# Patient Record
Sex: MALE | Race: WHITE
[De-identification: names, ages, dates, MRNs, and addresses within clinical notes are randomized per-mention and may not be internally consistent; named-entity substitution may affect disease eponyms.]

---

## 2022-07-14 ENCOUNTER — HOSPITAL ENCOUNTER (EMERGENCY)
Dept: HOSPITAL 47 - EC | Age: 42
LOS: 1 days | Discharge: OTHER INSTITUTION | End: 2022-07-15
Payer: COMMERCIAL

## 2022-07-14 VITALS — TEMPERATURE: 98.6 F

## 2022-07-14 DIAGNOSIS — K22.2: Primary | ICD-10-CM

## 2022-07-14 PROCEDURE — 71046 X-RAY EXAM CHEST 2 VIEWS: CPT

## 2022-07-14 PROCEDURE — 85025 COMPLETE CBC W/AUTO DIFF WBC: CPT

## 2022-07-14 PROCEDURE — 96374 THER/PROPH/DIAG INJ IV PUSH: CPT

## 2022-07-14 PROCEDURE — 80053 COMPREHEN METABOLIC PANEL: CPT

## 2022-07-14 PROCEDURE — 96375 TX/PRO/DX INJ NEW DRUG ADDON: CPT

## 2022-07-14 PROCEDURE — 36415 COLL VENOUS BLD VENIPUNCTURE: CPT

## 2022-07-14 PROCEDURE — 43247 EGD REMOVE FOREIGN BODY: CPT

## 2022-07-14 PROCEDURE — 70360 X-RAY EXAM OF NECK: CPT

## 2022-07-14 PROCEDURE — 99285 EMERGENCY DEPT VISIT HI MDM: CPT

## 2022-07-14 PROCEDURE — 96361 HYDRATE IV INFUSION ADD-ON: CPT

## 2022-07-15 VITALS — HEART RATE: 84 BPM | SYSTOLIC BLOOD PRESSURE: 113 MMHG | DIASTOLIC BLOOD PRESSURE: 79 MMHG

## 2022-07-15 VITALS — RESPIRATION RATE: 16 BRPM

## 2022-07-15 LAB
ALBUMIN SERPL-MCNC: 4.6 G/DL (ref 3.5–5)
ALP SERPL-CCNC: 107 U/L (ref 38–126)
ALT SERPL-CCNC: 30 U/L (ref 4–49)
ANION GAP SERPL CALC-SCNC: 13 MMOL/L
AST SERPL-CCNC: 32 U/L (ref 17–59)
BASOPHILS # BLD AUTO: 0 K/UL (ref 0–0.2)
BASOPHILS NFR BLD AUTO: 1 %
BUN SERPL-SCNC: 15 MG/DL (ref 9–20)
CALCIUM SPEC-MCNC: 10 MG/DL (ref 8.4–10.2)
CHLORIDE SERPL-SCNC: 101 MMOL/L (ref 98–107)
CO2 SERPL-SCNC: 24 MMOL/L (ref 22–30)
EOSINOPHIL # BLD AUTO: 0.1 K/UL (ref 0–0.7)
EOSINOPHIL NFR BLD AUTO: 1 %
ERYTHROCYTE [DISTWIDTH] IN BLOOD BY AUTOMATED COUNT: 4.83 M/UL (ref 4.3–5.9)
ERYTHROCYTE [DISTWIDTH] IN BLOOD: 13.7 % (ref 11.5–15.5)
GLUCOSE SERPL-MCNC: 96 MG/DL (ref 74–99)
HCT VFR BLD AUTO: 36.3 % (ref 39–53)
HGB BLD-MCNC: 11.4 GM/DL (ref 13–17.5)
LYMPHOCYTES # SPEC AUTO: 1.9 K/UL (ref 1–4.8)
LYMPHOCYTES NFR SPEC AUTO: 21 %
MCH RBC QN AUTO: 23.5 PG (ref 25–35)
MCHC RBC AUTO-ENTMCNC: 31.3 G/DL (ref 31–37)
MCV RBC AUTO: 75.2 FL (ref 80–100)
MONOCYTES # BLD AUTO: 0.5 K/UL (ref 0–1)
MONOCYTES NFR BLD AUTO: 6 %
NEUTROPHILS # BLD AUTO: 6.3 K/UL (ref 1.3–7.7)
NEUTROPHILS NFR BLD AUTO: 69 %
PLATELET # BLD AUTO: 398 K/UL (ref 150–450)
POTASSIUM SERPL-SCNC: 4.3 MMOL/L (ref 3.5–5.1)
PROT SERPL-MCNC: 8.2 G/DL (ref 6.3–8.2)
SODIUM SERPL-SCNC: 138 MMOL/L (ref 137–145)
WBC # BLD AUTO: 9.1 K/UL (ref 3.8–10.6)

## 2022-07-15 NOTE — XR
EXAMINATION TYPE: XR chest 2V

 

DATE OF EXAM: 7/15/2022

 

COMPARISON: NONE

 

HISTORY: Chest pain

 

TECHNIQUE: 2 views

 

FINDINGS: Heart and mediastinum are normal. Lungs are clear. Diaphragm is normal. Bony thorax appears
 normal.

 

IMPRESSION: Normal chest

## 2022-07-15 NOTE — P.PCN
Date of Procedure: 07/15/22


Procedure(s) Performed: 


BRIEF HISTORY: Patient is a 41-year-old, pleasant, white male in the emergency 

room with acute food impaction.  He was eating chicken nuggets 2 nights ago and 

cannot swallow any further.  He came into the emergency room last night 

scheduled for an upper endoscopy for foreign body removal.





PROCEDURE PERFORMED: Esophagogastroduodenoscopy.





PREOPERATIVE DIAGNOSIS: Acute food impaction. 





IV sedation per anesthesia. 





PROCEDURE: After informed consent was obtained, the patient  was brought into 

the endoscopy unit. IV sedation was administered by Anesthesia under continuous 

monitoring. Initially the Olympus GIF-140 video endoscope was inserted into the 

mouth. Esophagus intubated without any difficulty. It was gradually advanced 

into the esophagus.  A large piece of meat in the distal esophagus that was 

impacted.  Using a snare the meat was cut into pieces and was able to gently 

push it into the stomach.  The scope at this time was advanced into the stomach 

and duodenum and carefully examined. The bulb and the second part of the 

duodenum appeared normal. The scope at this time was withdrawn to the stomach, 

adequately insufflated with air, and upon careful examination, mucosa of the 

antrum, body, cardia and the fundus appeared normal. The scope was then 

withdrawn into the esophagus. The GE junction was located at 39 cm from the 

incisors.  Small hiatal hernia noted.  There was a distal esophageal stricture 

with circumferential erythema and some oozing at the site of food impaction.  

Dilation was not performed.  The rest of the esophagus appeared normal. There 

were no erosions or ulcerations seen and the patient tolerated the procedure 

well. 





IMPRESSION: 


1.  Distal esophageal stricture with large piece of the impacted status post 

removal as described above.


2.  Small hiatal hernia.





RECOMMENDATIONS: The findings of this examination were discussed with the josef bearnt as well as his family.  He was advised to continue with Protonix 40 mg 

daily and follow antireflux measures.  He was advised to follow up with his a 

progress for EGD with dilation of the esophageal stricture in the near future..

## 2022-07-15 NOTE — XR
EXAMINATION TYPE: XR soft tissue neck

 

DATE OF EXAM: 7/15/2022

 

COMPARISON: NONE

 

HISTORY: Choked on a chicken nugget

TECHNIQUE: 2 views

 

FINDINGS: Cervical vertebra have normal alignment. Epiglottis is normal. Subglottic trachea appears n
ormal. Prevertebral soft tissues appear normal. Tonsils and adenoids appear normal. No evidence of a 
foreign body.

 

IMPRESSION: Normal cervical soft tissue exam.

## 2022-07-15 NOTE — ED
ENT HPI





- General


Chief complaint: ENT


Stated complaint: difficulty swallowing


Time Seen by Provider: 07/14/22 23:46


Source: patient, family, RN notes reviewed


Mode of arrival: ambulatory


Limitations: no limitations





- History of Present Illness


Initial comments: 





Says a pleasant 41-year-old male presents emergency back complaining of an 

esophageal foreign body.  He had chicken nugget at about midnight on July 14.  

He states about 24 hours ago.  Patient states that since then he has had a hard 

time swallowing.  Patient states he is able to hold very tiny sips of fluids 

down but anytime she takes a large amount of fluid he vomits.  He has been 

unable to hold any solids down.  Patient has a foreign body sensation in his 

throat.





No headache, no fever or chills, no changes in vision or hearing, no sore throat

or difficulty with speech, no neck pain, no chest pain or shortness of breath, 

no abdominal pain, no nauseno changes in urination or bowel movements, no 

numbness or tingling, no extremity pain, no skin rashes or lesions.





Presently, the patient states she's had intermittent problems swallowing and 

having food boluses "getting stuck "for about 1.5 years..





- Related Data


                                Home Medications











 Medication  Instructions  Recorded  Confirmed


 


Pantoprazole Sodium [Protonix] 40 mg PO BID 07/15/22 07/15/22











                                    Allergies











Allergy/AdvReac Type Severity Reaction Status Date / Time


 


No Known Allergies Allergy   Verified 07/15/22 08:16














Review of Systems


ROS Statement: 


Those systems with pertinent positive or pertinent negative responses have been 

documented in the HPI.





ROS Other: All systems not noted in ROS Statement are negative.





Past Medical History


Additional Past Medical History / Comment(s): hernia


History of Any Multi-Drug Resistant Organisms: None Reported


Past Surgical History: No Surgical Hx Reported


Past Psychological History: No Psychological Hx Reported


Smoking Status: Former smoker


Past Alcohol Use History: Occasional


Past Drug Use History: None Reported





General Exam





- General Exam Comments


Initial Comments: 





Condition does not appear to be ill or toxic.  Vital signs reviewed.  Cranial 

nerves II through XII grossly intact


Limitations: no limitations


General appearance: alert, in no apparent distress


Head exam: Present: atraumatic, normocephalic, normal inspection


Eye exam: Present: normal appearance, PERRL, EOMI.  Absent: scleral icterus, 

conjunctival injection, periorbital swelling


ENT exam: Present: normal exam, normal oropharynx, mucous membranes dry, mucous 

membranes moist, normal external ear exam


Neck exam: Present: normal inspection, full ROM.  Absent: tenderness, 

meningismus, lymphadenopathy


Respiratory exam: Present: normal lung sounds bilaterally.  Absent: respiratory 

distress, wheezes, rales, rhonchi, stridor


Cardiovascular Exam: Present: regular rate, normal rhythm, normal heart sounds. 

 Absent: systolic murmur, diastolic murmur, rubs, gallop, clicks


GI/Abdominal exam: Present: soft, normal bowel sounds.  Absent: distended, 

tenderness, guarding, rebound, rigid


Extremities exam: Present: normal inspection, full ROM, normal capillary refill.

  Absent: tenderness, pedal edema, joint swelling, calf tenderness


Back exam: Present: normal inspection


Neurological exam: Present: alert, oriented X3, CN II-XII intact


Psychiatric exam: Present: normal affect, normal mood


Skin exam: Present: warm, dry, intact, normal color.  Absent: rash





Course


                                   Vital Signs











  07/14/22 07/15/22





  23:41 08:08


 


Temperature 98.6 F 


 


Pulse Rate 71 96


 


Respiratory 22 16





Rate  


 


Blood Pressure 118/73 115/71


 


O2 Sat by Pulse 100 100





Oximetry  














- Reevaluation(s)


Reevaluation #1: 





07/15/22 01:52


Medical record is reviewed


Patientis after administration of glucagon, diazepam, nitroglycerin.  Patient 

unable to swallow liquids or solids.


Patient is informed of results and questions answered


Patient in no distress








- Consultations


Consultation #1: 





Consultation call placed for gastroenterology





Medical Decision Making





- Medical Decision Making





Patient percents of symptomology most consistent with esophageal foreign body.  

We'll try the glucagon, diazepam, nitroglycerin cocktail.  Soft tissue x-rays, 

basic laboratory work, plan for reevaluation





Note the patient does have a history of a hiatal hernia.  However this does not 

appear to be consistent with hiatal hernia symptomology.





Presentation not consistent with cardiopulmonary disease.





Patient endorsed to Dr. Trujillo at 3 AM





Awaiting callback from gastroenterology





- Lab Data


Result diagrams: 


                                 07/15/22 00:00





                                 07/15/22 00:00


                                   Lab Results











  07/15/22 07/15/22 Range/Units





  00:00 00:00 


 


WBC  9.1   (3.8-10.6)  k/uL


 


RBC  4.83   (4.30-5.90)  m/uL


 


Hgb  11.4 L   (13.0-17.5)  gm/dL


 


Hct  36.3 L   (39.0-53.0)  %


 


MCV  75.2 L   (80.0-100.0)  fL


 


MCH  23.5 L   (25.0-35.0)  pg


 


MCHC  31.3   (31.0-37.0)  g/dL


 


RDW  13.7   (11.5-15.5)  %


 


Plt Count  398   (150-450)  k/uL


 


MPV  8.2   


 


Neutrophils %  69   %


 


Lymphocytes %  21   %


 


Monocytes %  6   %


 


Eosinophils %  1   %


 


Basophils %  1   %


 


Neutrophils #  6.3   (1.3-7.7)  k/uL


 


Lymphocytes #  1.9   (1.0-4.8)  k/uL


 


Monocytes #  0.5   (0-1.0)  k/uL


 


Eosinophils #  0.1   (0-0.7)  k/uL


 


Basophils #  0.0   (0-0.2)  k/uL


 


Hypochromasia  Moderate   


 


Microcytosis  Slight   


 


Sodium   138  (137-145)  mmol/L


 


Potassium   4.3  (3.5-5.1)  mmol/L


 


Chloride   101  ()  mmol/L


 


Carbon Dioxide   24  (22-30)  mmol/L


 


Anion Gap   13  mmol/L


 


BUN   15  (9-20)  mg/dL


 


Creatinine   0.92  (0.66-1.25)  mg/dL


 


Est GFR (CKD-EPI)AfAm   >90  (>60 ml/min/1.73 sqM)  


 


Est GFR (CKD-EPI)NonAf   >90  (>60 ml/min/1.73 sqM)  


 


Glucose   96  (74-99)  mg/dL


 


Calcium   10.0  (8.4-10.2)  mg/dL


 


Total Bilirubin   0.4  (0.2-1.3)  mg/dL


 


AST   32  (17-59)  U/L


 


ALT   30  (4-49)  U/L


 


Alkaline Phosphatase   107  ()  U/L


 


Total Protein   8.2  (6.3-8.2)  g/dL


 


Albumin   4.6  (3.5-5.0)  g/dL














Disposition


Clinical Impression: 


 Esophageal obstruction due to food impaction





Disposition: ADMITTED AS IP TO THIS [facility]


Condition: Fair


Is patient prescribed a controlled substance at d/c from ED?: No


Time of Disposition: 01:53


Decision to Admit Reason: Admit from EC


Decision Time: 01:53

## 2022-07-25 ENCOUNTER — HOSPITAL ENCOUNTER (EMERGENCY)
Dept: HOSPITAL 47 - EC | Age: 42
Discharge: HOME | End: 2022-07-25
Payer: COMMERCIAL

## 2022-07-25 VITALS
RESPIRATION RATE: 18 BRPM | HEART RATE: 77 BPM | SYSTOLIC BLOOD PRESSURE: 119 MMHG | DIASTOLIC BLOOD PRESSURE: 77 MMHG | TEMPERATURE: 97 F

## 2022-07-25 DIAGNOSIS — R59.0: Primary | ICD-10-CM

## 2022-07-25 DIAGNOSIS — Z87.891: ICD-10-CM

## 2022-07-25 LAB
ALBUMIN SERPL-MCNC: 4.3 G/DL (ref 3.5–5)
ALP SERPL-CCNC: 108 U/L (ref 38–126)
ALT SERPL-CCNC: 22 U/L (ref 4–49)
AMYLASE SERPL-CCNC: 63 U/L (ref 30–110)
ANION GAP SERPL CALC-SCNC: 10 MMOL/L
AST SERPL-CCNC: 30 U/L (ref 17–59)
BASOPHILS # BLD AUTO: 0 K/UL (ref 0–0.2)
BASOPHILS NFR BLD AUTO: 0 %
BUN SERPL-SCNC: 8 MG/DL (ref 9–20)
CALCIUM SPEC-MCNC: 8.9 MG/DL (ref 8.4–10.2)
CHLORIDE SERPL-SCNC: 105 MMOL/L (ref 98–107)
CO2 SERPL-SCNC: 23 MMOL/L (ref 22–30)
EOSINOPHIL # BLD AUTO: 0 K/UL (ref 0–0.7)
EOSINOPHIL NFR BLD AUTO: 0 %
ERYTHROCYTE [DISTWIDTH] IN BLOOD BY AUTOMATED COUNT: 4.57 M/UL (ref 4.3–5.9)
ERYTHROCYTE [DISTWIDTH] IN BLOOD: 13.8 % (ref 11.5–15.5)
GLUCOSE SERPL-MCNC: 102 MG/DL (ref 74–99)
HCT VFR BLD AUTO: 34.2 % (ref 39–53)
HGB BLD-MCNC: 10.6 GM/DL (ref 13–17.5)
LIPASE SERPL-CCNC: 81 U/L (ref 23–300)
LYMPHOCYTES # SPEC AUTO: 1.1 K/UL (ref 1–4.8)
LYMPHOCYTES NFR SPEC AUTO: 15 %
MCH RBC QN AUTO: 23.3 PG (ref 25–35)
MCHC RBC AUTO-ENTMCNC: 31.1 G/DL (ref 31–37)
MCV RBC AUTO: 74.8 FL (ref 80–100)
MONOCYTES # BLD AUTO: 0.4 K/UL (ref 0–1)
MONOCYTES NFR BLD AUTO: 5 %
NEUTROPHILS # BLD AUTO: 5.7 K/UL (ref 1.3–7.7)
NEUTROPHILS NFR BLD AUTO: 77 %
PH UR: 6.5 [PH] (ref 5–8)
PLATELET # BLD AUTO: 336 K/UL (ref 150–450)
POTASSIUM SERPL-SCNC: 4.1 MMOL/L (ref 3.5–5.1)
PROT SERPL-MCNC: 7.7 G/DL (ref 6.3–8.2)
SODIUM SERPL-SCNC: 138 MMOL/L (ref 137–145)
SP GR UR: 1.02 (ref 1–1.03)
UROBILINOGEN UR QL STRIP: <2 MG/DL (ref ?–2)
WBC # BLD AUTO: 7.4 K/UL (ref 3.8–10.6)

## 2022-07-25 PROCEDURE — 83690 ASSAY OF LIPASE: CPT

## 2022-07-25 PROCEDURE — 83605 ASSAY OF LACTIC ACID: CPT

## 2022-07-25 PROCEDURE — 82150 ASSAY OF AMYLASE: CPT

## 2022-07-25 PROCEDURE — 96360 HYDRATION IV INFUSION INIT: CPT

## 2022-07-25 PROCEDURE — 85025 COMPLETE CBC W/AUTO DIFF WBC: CPT

## 2022-07-25 PROCEDURE — 81003 URINALYSIS AUTO W/O SCOPE: CPT

## 2022-07-25 PROCEDURE — 36415 COLL VENOUS BLD VENIPUNCTURE: CPT

## 2022-07-25 PROCEDURE — 99284 EMERGENCY DEPT VISIT MOD MDM: CPT

## 2022-07-25 PROCEDURE — 80053 COMPREHEN METABOLIC PANEL: CPT

## 2022-07-25 PROCEDURE — 74177 CT ABD & PELVIS W/CONTRAST: CPT

## 2022-07-25 NOTE — ED
General Adult HPI





- General


Chief complaint: Abdominal Pain


Stated complaint: Fever,RT sided ABD pain


Time Seen by Provider: 07/25/22 08:35


Source: patient, RN notes reviewed, old records reviewed


Mode of arrival: ambulatory


Limitations: no limitations





- History of Present Illness


Initial comments: 





41-year-old well-appearing male presents to the emergency room with intermittent

right groin pain.  Patient states that he did notice a tender lump which r

esolves when he lays down.  Patient states when he first noticed some swelling 

he went to his primary care, Dr. Bush Wednesday who did an ultrasound in the 

office and states that he had some swollen lymph nodes and a possible inguinal 

hernia.  He states that after Dr. Bush evaluated him and pushed on his groing

he's had pain since.  He was prescribed antibiotics for lymphadenopathy and 

started them on Friday. He was referred to surgery but does not have an 

appointment until the middle of August.  Denies any nausea vomiting diarrhea.  


-: month(s)


Location: abdomen (Right groin)


Severity scale (1-10): 0


Consistency: intermittent, now resolved


Associated Symptoms: fever/chills





- Related Data


                                Home Medications











 Medication  Instructions  Recorded  Confirmed


 


Pantoprazole Sodium [Protonix] 40 mg PO BID 07/15/22 07/25/22


 


cephALEXin [cephALEXin Oral Susp] 500 mg PO TID 07/25/22 07/25/22











                                    Allergies











Allergy/AdvReac Type Severity Reaction Status Date / Time


 


No Known Allergies Allergy   Verified 07/25/22 10:04














Review of Systems


ROS Statement: 


Those systems with pertinent positive or pertinent negative responses have been 

documented in the HPI.





ROS Other: All systems not noted in ROS Statement are negative.





Past Medical History


Additional Past Medical History / Comment(s): hernia


History of Any Multi-Drug Resistant Organisms: None Reported


Past Surgical History: No Surgical Hx Reported


Past Psychological History: No Psychological Hx Reported


Smoking Status: Former smoker


Past Alcohol Use History: Occasional


Past Drug Use History: None Reported





General Exam


Limitations: no limitations


General appearance: alert, in no apparent distress


Head exam: Present: atraumatic


Eye exam: Present: normal appearance.  Absent: scleral icterus, conjunctival 

injection


Respiratory exam: Present: normal lung sounds bilaterally.  Absent: respiratory 

distress, accessory muscle use


Cardiovascular Exam: Present: tachycardia


GI/Abdominal exam: Present: soft, other (Right inguinal pain).  Absent: 

distended, tenderness


Extremities exam: Present: normal capillary refill.  Absent: pedal edema


Back exam: Present: normal inspection, full ROM.  Absent: tenderness, CVA 

tenderness (R), CVA tenderness (L), rash noted


Neurological exam: Present: alert, oriented X3, normal gait


Psychiatric exam: Present: normal affect, normal mood


Skin exam: Present: warm, dry, normal color.  Absent: cyanosis, diaphoretic





Course


                                   Vital Signs











  07/25/22 07/25/22





  08:03 10:00


 


Temperature 98.5 F 97 F L


 


Pulse Rate 108 H 77


 


Respiratory 16 18





Rate  


 


Blood Pressure 141/99 119/77


 


O2 Sat by Pulse 99 97





Oximetry  














Medical Decision Making





- Medical Decision Making


Patient presents with right groin pain, was seen by his primary care doctor and 

treated for lymphadenopathy and is currently taking antibiotics.  He denies any 

penile discharge or testicular pain.  He states that he did have a fever which 

has resolved.  Vital signs are stable.  There is no evidence of incarcerated 

hernia.


CT shows mildly large right inguinal lymph nodes with some associated mild fat 

stranding. 


No evidence of leukocytosis.  Hemoglobin and hematocrit are stable.  Lactic acid

is 1.1.  Electrolytes are unremarkable.  Urinalysis is negative.  


He'll  was directed to continue the antibiotics and follow up with his primary 

care doctor, keep the appointment with surgery on August 15.


He was directed to return to the emergency room with a new or concerning sympt

oms including worsening pain, persistent nausea vomiting or fevers.   Patient is

agreeable to this plan of care. 


Case discussed with Dr. Leigh. 








- Lab Data


Result diagrams: 


                                 07/25/22 08:15





                                 07/25/22 08:15


                                   Lab Results











  07/25/22 07/25/22 07/25/22 Range/Units





  08:15 08:15 08:15 


 


WBC  7.4    (3.8-10.6)  k/uL


 


RBC  4.57    (4.30-5.90)  m/uL


 


Hgb  10.6 L    (13.0-17.5)  gm/dL


 


Hct  34.2 L    (39.0-53.0)  %


 


MCV  74.8 L    (80.0-100.0)  fL


 


MCH  23.3 L    (25.0-35.0)  pg


 


MCHC  31.1    (31.0-37.0)  g/dL


 


RDW  13.8    (11.5-15.5)  %


 


Plt Count  336    (150-450)  k/uL


 


MPV  7.9    


 


Neutrophils %  77    %


 


Lymphocytes %  15    %


 


Monocytes %  5    %


 


Eosinophils %  0    %


 


Basophils %  0    %


 


Neutrophils #  5.7    (1.3-7.7)  k/uL


 


Lymphocytes #  1.1    (1.0-4.8)  k/uL


 


Monocytes #  0.4    (0-1.0)  k/uL


 


Eosinophils #  0.0    (0-0.7)  k/uL


 


Basophils #  0.0    (0-0.2)  k/uL


 


Hypochromasia  Marked    


 


Microcytosis  Slight    


 


Sodium   138   (137-145)  mmol/L


 


Potassium   4.1   (3.5-5.1)  mmol/L


 


Chloride   105   ()  mmol/L


 


Carbon Dioxide   23   (22-30)  mmol/L


 


Anion Gap   10   mmol/L


 


BUN   8 L   (9-20)  mg/dL


 


Creatinine   0.68   (0.66-1.25)  mg/dL


 


Est GFR (CKD-EPI)AfAm   >90   (>60 ml/min/1.73 sqM)  


 


Est GFR (CKD-EPI)NonAf   >90   (>60 ml/min/1.73 sqM)  


 


Glucose   102 H   (74-99)  mg/dL


 


Plasma Lactic Acid Devonte     (0.7-2.0)  mmol/L


 


Calcium   8.9   (8.4-10.2)  mg/dL


 


Total Bilirubin   0.5   (0.2-1.3)  mg/dL


 


AST   30   (17-59)  U/L


 


ALT   22   (4-49)  U/L


 


Alkaline Phosphatase   108   ()  U/L


 


Total Protein   7.7   (6.3-8.2)  g/dL


 


Albumin   4.3   (3.5-5.0)  g/dL


 


Amylase    63  ()  U/L


 


Lipase    81  ()  U/L


 


Urine Color     


 


Urine Appearance     (Clear)  


 


Urine pH     (5.0-8.0)  


 


Ur Specific Gravity     (1.001-1.035)  


 


Urine Protein     (Negative)  


 


Urine Glucose (UA)     (Negative)  


 


Urine Ketones     (Negative)  


 


Urine Blood     (Negative)  


 


Urine Nitrite     (Negative)  


 


Urine Bilirubin     (Negative)  


 


Urine Urobilinogen     (<2.0)  mg/dL


 


Ur Leukocyte Esterase     (Negative)  














  07/25/22 07/25/22 Range/Units





  09:08 09:14 


 


WBC    (3.8-10.6)  k/uL


 


RBC    (4.30-5.90)  m/uL


 


Hgb    (13.0-17.5)  gm/dL


 


Hct    (39.0-53.0)  %


 


MCV    (80.0-100.0)  fL


 


MCH    (25.0-35.0)  pg


 


MCHC    (31.0-37.0)  g/dL


 


RDW    (11.5-15.5)  %


 


Plt Count    (150-450)  k/uL


 


MPV    


 


Neutrophils %    %


 


Lymphocytes %    %


 


Monocytes %    %


 


Eosinophils %    %


 


Basophils %    %


 


Neutrophils #    (1.3-7.7)  k/uL


 


Lymphocytes #    (1.0-4.8)  k/uL


 


Monocytes #    (0-1.0)  k/uL


 


Eosinophils #    (0-0.7)  k/uL


 


Basophils #    (0-0.2)  k/uL


 


Hypochromasia    


 


Microcytosis    


 


Sodium    (137-145)  mmol/L


 


Potassium    (3.5-5.1)  mmol/L


 


Chloride    ()  mmol/L


 


Carbon Dioxide    (22-30)  mmol/L


 


Anion Gap    mmol/L


 


BUN    (9-20)  mg/dL


 


Creatinine    (0.66-1.25)  mg/dL


 


Est GFR (CKD-EPI)AfAm    (>60 ml/min/1.73 sqM)  


 


Est GFR (CKD-EPI)NonAf    (>60 ml/min/1.73 sqM)  


 


Glucose    (74-99)  mg/dL


 


Plasma Lactic Acid Devonte  1.1   (0.7-2.0)  mmol/L


 


Calcium    (8.4-10.2)  mg/dL


 


Total Bilirubin    (0.2-1.3)  mg/dL


 


AST    (17-59)  U/L


 


ALT    (4-49)  U/L


 


Alkaline Phosphatase    ()  U/L


 


Total Protein    (6.3-8.2)  g/dL


 


Albumin    (3.5-5.0)  g/dL


 


Amylase    ()  U/L


 


Lipase    ()  U/L


 


Urine Color   Yellow  


 


Urine Appearance   Clear  (Clear)  


 


Urine pH   6.5  (5.0-8.0)  


 


Ur Specific Gravity   1.025  (1.001-1.035)  


 


Urine Protein   Negative  (Negative)  


 


Urine Glucose (UA)   Negative  (Negative)  


 


Urine Ketones   Negative  (Negative)  


 


Urine Blood   Negative  (Negative)  


 


Urine Nitrite   Negative  (Negative)  


 


Urine Bilirubin   Negative  (Negative)  


 


Urine Urobilinogen   <2.0  (<2.0)  mg/dL


 


Ur Leukocyte Esterase   Negative  (Negative)  














Disposition


Clinical Impression: 


 Lymphadenopathy, inguinal





Disposition: HOME SELF-CARE


Condition: Good


Instructions (If sedation given, give patient instructions):  Lymphadenopathy 

(ED)


Additional Instructions: 


Continue taking the antibiotics as prescribed by your primary care doctor.  

Follow-up with your primary care doctor this week.  Keep your appointment with 

your surgeon as scheduled.


Is patient prescribed a controlled substance at d/c from ED?: No


Referrals: 


Ulysses Bush MD [Primary Care Provider] - 1-2 days


Time of Disposition: 10:22

## 2022-07-25 NOTE — CT
EXAMINATION TYPE: CT abdomen pelvis w con

 

DATE OF EXAM: 7/25/2022

 

COMPARISON: NONE

 

HISTORY: 41-year-old male abdominal pain, right groin pain

 

TECHNIQUE: Contiguous axial scanning of the abdomen and pelvis following administration of 100 ml Iso
manny 300 IV contrast.  Delayed images through the kidneys and coronal/sagittal reconstructions perform
ed.

 

CT DLP: 1308.9 mGycm

Automated exposure control for dose reduction was used.

 

 

FINDINGS: Heart normal size without pericardial effusion. Lung bases clear without pleural effusion.

 

Mild circumferential wall thickening distal esophagus with some adjacent borderline to mildly enlarge
d lower paraesophageal lymph nodes measuring up to 1.1 cm.

 

No focal liver lesion or biliary ductal dilatation. Portal venous system is patent.

 

No abnormal gallbladder distention. There is a 2.1 cm gallstone noted.

 

Adrenal glands, kidneys, spleen with anterior splenules, and pancreas within normal limits.

 

No dilated small bowel, free fluid, or free air. No mesenteric or retroperitoneal lymphadenopathy.

 

Previous ventral abdominal wall mesh repair.

 

Normal appendix.  Scattered moderate stool.

 

Suspect prominent pericolonic vessels rather than mild fat stranding distal sigmoid colon. Clinically
 correlate to exclude any symptoms of mild colitis which is considered less likely.

 

Bladder incompletely distended. Left-sided pelvic phleboliths.

 

Asymmetrically larger right inguinal nodes measuring up to 1.8 cm with some associated mild fat stran
ding near in the subcutaneous adipose.

 

Bones: No osseous destructive process.

 

 

IMPRESSION: 

 

1. MILDLY ENLARGED RIGHT INGUINAL LYMPH NODES MEASURING UP TO 1.8 CM WITH SOME ASSOCIATED MILD FAT ST
RANDING IN THE SUBCUTANEOUS FAT HERE. QUERY CELLULITIS OR LYMPHADENITIS. CLINICAL FOLLOW-UP RECOMMEND
ED WITH TREATMENT TO ENSURE IMPROVEMENT. IF LYMPH NODES PERSIST OR PROGRESSIVELY ENLARGE, THE AREA CA
N BE RE-IMAGED OR THE LYMPH NODES CAN BE SAMPLED.

2. CIRCUMFERENTIAL WALL THICKENING DISTAL ESOPHAGUS WITH A FEW ADJACENT BORDERLINE TO MILDLY ENLARGED
 LYMPH NODES MEASURING UP TO 1.1 CM. CORRELATE FOR ESOPHAGITIS. GIVEN THE LYMPH NODES, CONSIDER DIREC
T VISUALIZATION TO EXCLUDE A SUBTLE UNDERLYING MUCOSAL LESION.

3. A 2.1 CM GALLSTONE.

4. SOME PERICOLONIC FAT STRANDING DISTAL SIGMOID COLON FAVORED TO REPRESENT PROMINENT PERICOLONIC VES
SELS RATHER THAN MILD INFLAMMATION. CLINICALLY CORRELATE.

## 2022-07-27 ENCOUNTER — HOSPITAL ENCOUNTER (OUTPATIENT)
Dept: HOSPITAL 47 - ORWHC2ENDO | Age: 42
Discharge: HOME | End: 2022-07-27
Attending: INTERNAL MEDICINE
Payer: COMMERCIAL

## 2022-07-27 VITALS — BODY MASS INDEX: 30.8 KG/M2

## 2022-07-27 VITALS — RESPIRATION RATE: 16 BRPM | TEMPERATURE: 98.1 F

## 2022-07-27 VITALS — SYSTOLIC BLOOD PRESSURE: 128 MMHG | HEART RATE: 72 BPM | DIASTOLIC BLOOD PRESSURE: 75 MMHG

## 2022-07-27 DIAGNOSIS — Z87.891: ICD-10-CM

## 2022-07-27 DIAGNOSIS — K22.10: ICD-10-CM

## 2022-07-27 DIAGNOSIS — K22.2: Primary | ICD-10-CM

## 2022-07-27 DIAGNOSIS — Z79.899: ICD-10-CM

## 2022-07-27 DIAGNOSIS — K44.9: ICD-10-CM

## 2022-07-27 PROCEDURE — 43249 ESOPH EGD DILATION <30 MM: CPT

## 2022-07-27 NOTE — P.PCN
Date of Procedure: 07/27/22


Procedure(s) Performed: 


BRIEF HISTORY: Patient is a 41-year-old, pleasant, white male scheduled for an 

upper endoscopy as a part of evaluation of recent episode of food impaction 

requiring emergency upper endoscopy 2 weeks ago.  The upper endoscopy revealed 

evidence of distal esophageal stricture.  He continues to have intermittent 

dysphagia to solids and he scheduled for an elective upper endoscopy for 

dilation today.. 





PROCEDURE PERFORMED: Esophagogastroduodenoscopy with dilation.





PREOPERATIVE DIAGNOSIS: Dysphagia and distal esophagueal stricture. 





IV sedation per anesthesia. 





PROCEDURE: After informed consent was obtained, the patient  was brought into 

the endoscopy unit. IV sedation was administered by Anesthesia under continuous 

monitoring. Initially the Olympus GIF-140 video endoscope was inserted into the 

mouth. Esophagus intubated without any difficulty. It was gradually advanced int

o the stomach and duodenum and carefully examined. The bulb and the second part 

of the duodenum appeared normal. The scope at this time was withdrawn to the 

stomach, adequately insufflated with air, and upon careful examination, mucosa 

of the antrum, body, cardia and the fundus appeared normal. The scope was then 

withdrawn into the esophagus.  Small hiatal hernia noted.  The GE junction was 

located at 38 cm from the incisors.  There was a distal esophageal stricture 

identified with some proceeded with balloon dilation using 15 and 16.5 limited 

TTS balloon in a sequential fashion for 30 seconds.  Following this there was 

brisk oozing identified from the site of dilation and hence further dilation was

not performed.  There were ulcerations and erosions of the distal esophagus 

consistent with LA grade C reflux esophagitis.  The rest of esophagus appeared 

normal and the patient tolerated the procedure well 





IMPRESSION: 


1.  Distal esophageal stricture status post dilation using 15 and 16.5 mm TTS 

balloon as described above.


2.  Erosions or ulcerations at the GE junction consistent with LA grade C reflux

esophagitis


3.  Small hiatal hernia.





RECOMMENDATIONS: The findings of this examination were discussed with the 

patient as well as his family.  He was advised to be on a clear liquid diet for 

lunch today.  Continue with Protonix 40 mg twice daily and follow antireflux 

measures.  He will be seen in office in 3 months..